# Patient Record
Sex: FEMALE | Race: OTHER | Employment: UNEMPLOYED | URBAN - METROPOLITAN AREA
[De-identification: names, ages, dates, MRNs, and addresses within clinical notes are randomized per-mention and may not be internally consistent; named-entity substitution may affect disease eponyms.]

---

## 2017-09-20 ENCOUNTER — OFFICE VISIT (OUTPATIENT)
Dept: RHEUMATOLOGY | Age: 37
End: 2017-09-20

## 2017-09-20 VITALS
SYSTOLIC BLOOD PRESSURE: 121 MMHG | DIASTOLIC BLOOD PRESSURE: 83 MMHG | WEIGHT: 208.2 LBS | RESPIRATION RATE: 18 BRPM | OXYGEN SATURATION: 97 % | BODY MASS INDEX: 32.68 KG/M2 | HEIGHT: 67 IN | HEART RATE: 79 BPM | TEMPERATURE: 98.7 F

## 2017-09-20 DIAGNOSIS — L50.8 CHRONIC URTICARIA: Primary | ICD-10-CM

## 2017-09-20 RX ORDER — METHYLDOPA 250 MG/1
250 TABLET, FILM COATED ORAL DAILY
COMMUNITY

## 2017-09-20 RX ORDER — ACETAMINOPHEN, DIPHENHYDRAMINE HCL, PHENYLEPHRINE HCL 325; 25; 5 MG/1; MG/1; MG/1
TABLET ORAL
COMMUNITY

## 2017-09-20 RX ORDER — ESCITALOPRAM OXALATE 20 MG/1
20 TABLET ORAL DAILY
COMMUNITY

## 2017-09-20 RX ORDER — DIPHENHYDRAMINE HCL 25 MG
50 TABLET ORAL
COMMUNITY

## 2017-09-20 NOTE — PROGRESS NOTES
RHEUMATOLOGY PROBLEM LIST AND CHIEF COMPLAINT  1. Chronic urticaria (2014) - refractory to cyclosporine, tacrolimus, humira, hydroxychloroquine, xolair and topical medications. CellCept (07/2014-01/2016), Cyclosporine (2014-stopped by patient in 8/2015)  2. Ulcerative Coilitis s/p colectomy, no spondyloarthropathy suspected Previously treated with: Remicade, Humira  3. Myositis - steroid induced     INTERVAL HISTORY  This is a 30 y.o.  female. Today, the patient complains of no pain in the joints. Location: NA  Severity:  0 on a scale of 0-10  Timing: none at this time   Duration:  1 years  Modifying factors: Eczema and Erythema  Context/Associated signs and symptoms: The patient was last seen February 2016. She complains of eczema on her left arm/chest and erythema on her face and eyelids. She also mentions severe itching with no skin presentation. She is currently taking Zyrtec and Benadryl. She does not see Allergy. She denies any life changes. She has no complaints regarding her Ulcerative Colitis and mentions undergoing total colectomy recently. RHEUMATOLOGY REVIEW OF SYSTEMS   Positives as per history  Negatives as follows:  Brenna Moros:  Denies unexplained persistent fevers or weight change  RESPIRATORY:  No pleuritic pain, exertional dyspnea  CARDIOVASCULAR:  Denies chest pain  GASTRO:   Denies heartburn, abdominal pain, nausea, vomiting, diarrhea  MSK:    No morning stiffness >1 hour, persistent joint swelling    PAST MEDICAL HISTORY  Reviewed with patient, significant changes in medical history - no     PHYSICAL EXAM  Blood pressure 121/83, pulse 79, temperature 98.7 °F (37.1 °C), temperature source Oral, resp. rate 18, height 5' 7\" (1.702 m), weight 208 lb 3.2 oz (94.4 kg), last menstrual period 08/27/2017, SpO2 97 %. GENERAL APPEARANCE: Well-nourished, no acute distress  NECK: No adenopathy  ENT: No oral ulcers  CARDIOVASCULAR: Heart rhythm is regular.  No murmur, rub, gallop  CHEST: Normal vesicular breath sounds. No wheezes, rales, pleural friction rubs  ABDOMINAL: The abdomen is soft and nontender. Bowel sounds are normal  SKIN: Eczema over left underarm. Erythematous rash over eyes and face. MUSCULOSKELETAL:  Upper extremities - full range of motion, no tenderness, no swelling, no synovial thickening and no deformity of joints  Lower extremities - full range of motion, no tenderness, no swelling, no synovial thickening and no deformity of joints     LABS, RADIOLOGY AND PROCEDURES   Previous labs reviewed -Yes    ASSESSMENT   1. Recurrent hives (Established problem - Very good partial response) - I have a low suspicion that the current episode is a recurrence of the severe previous symptoms. I will provide her with an intramuscular steroid injection and refer her to Allergy. She should return as needed. 2. Pain syndrome / deconditioning - Not currently an issues. Doing well with limited ROM exercises. PLAN   1. Right Intramuscular Steroid 80 mg  2. Referral to Allergy    Follow up PRN - patient does not have apparent autoimmune disease at this point and does not need routine followup    Alecia Desai MD  Adult and Pediatric Rheumatology     [facility] [address], Phone [phone], Fax [phone]     Visiting  of Pediatrics    Department of Pediatrics, [facility] [place], [place] of [address] [place] [address], [address] [place] [address], Phone [phone], Fax [phone]    There are no Patient Instructions on file for this visit. cc:  Nuzhat Shea MD    Written by leana Molina, as dictated by Everton Desai M.D.    [facility] NOTE        Chart reviewed for the following:   IKeke, have reviewed the History, Physical and updated the Allergic reactions for Wander Hu TIME OUT performed immediately prior to start of procedure:   Bashir AYALA, have performed the following reviews on Chantel Comp prior to the start of the procedure:            * Patient was identified by name and date of birth   * Agreement on procedure being performed was verified  * Risks and Benefits explained to the patient  * Procedure site verified and marked as necessary  * Patient was positioned for comfort  * Consent was signed and verified     Time: 11:30      Date of procedure: 9/20/2017    Procedure performed by: Bashir Perales MD    Provider assisted by: none    Patient assisted by: self    How tolerated by patient: tolerated the procedure well with no complications    Post Procedural Pain Scale: 0 - No Hurt    Comments: none    PROCEDURE  After consent was obtained, using sterile technique the right intramuscular deltoid was prepped and Depo-medrol 80 mg was then injected and the needle withdrawn. The procedure was well tolerated. The patient is asked to continue to rest for 24 hours before resuming regular activities. It may be more painful for the first 1-2 days. Watch for fever, or increased swelling or persistent pain. Call or return to clinic prn if such symptoms occur. Total face-to face time was 40 minutes, greater than 50% of which was spent in counseling and coordination of care. The diagnosis, treatment and various other items were discussed in detail: Test results, medication options, possible side effects, lifestyle changes.